# Patient Record
Sex: FEMALE | NOT HISPANIC OR LATINO | URBAN - METROPOLITAN AREA
[De-identification: names, ages, dates, MRNs, and addresses within clinical notes are randomized per-mention and may not be internally consistent; named-entity substitution may affect disease eponyms.]

---

## 2018-10-24 ENCOUNTER — IMPORTED ENCOUNTER (OUTPATIENT)
Dept: URBAN - METROPOLITAN AREA CLINIC 1 | Facility: CLINIC | Age: 58
End: 2018-10-24

## 2018-10-24 PROBLEM — H40.023: Noted: 2018-10-24

## 2018-10-24 PROBLEM — H25.813: Noted: 2018-10-24

## 2018-10-24 PROBLEM — H04.123: Noted: 2018-10-24

## 2018-10-24 PROBLEM — H16.143: Noted: 2018-10-24

## 2018-10-24 PROCEDURE — 92014 COMPRE OPH EXAM EST PT 1/>: CPT

## 2018-10-24 PROCEDURE — 92133 CPTRZD OPH DX IMG PST SGM ON: CPT

## 2018-10-24 PROCEDURE — 92015 DETERMINE REFRACTIVE STATE: CPT

## 2018-10-24 NOTE — PATIENT DISCUSSION
1.  Glaucoma Suspect OU (CD: 0.55 / 0.60) -- IOP stable today at 14 OU. Negative family h/o Glaucoma. OCT today shows minimal thinning OU. Patient is considered high risk. Condition was discussed with patient and patient understands. Will continue to monitor patient for any progression in condition. Patient was advised to call us with any problems questions or concerns. 2.  NOELLE w/ PEK OU -- Recommend the frequent use of OTC AT's BID-QID OU. 3.  Cataracts OU -- Observe for now without intervention. The patient was advised to contact us if any change or worsening of vision. 4. Pituitary Adenoma -- Previous : WNL OU. Finalized Glasses MRx & given to patient today. Return for an appointment in 6 MO for a 10 / 24-2 HVF OU with Dr. Daly Moctezuma.

## 2019-11-18 ENCOUNTER — IMPORTED ENCOUNTER (OUTPATIENT)
Dept: URBAN - METROPOLITAN AREA CLINIC 1 | Facility: CLINIC | Age: 59
End: 2019-11-18

## 2019-11-18 PROBLEM — H25.813: Noted: 2019-11-18

## 2019-11-18 PROBLEM — H40.023: Noted: 2019-11-18

## 2019-11-18 PROBLEM — H40.013: Noted: 2019-11-18

## 2019-11-18 PROBLEM — H16.143: Noted: 2019-11-18

## 2019-11-18 PROBLEM — H04.123: Noted: 2019-11-18

## 2019-11-18 PROCEDURE — 92083 EXTENDED VISUAL FIELD XM: CPT

## 2019-11-18 PROCEDURE — 92014 COMPRE OPH EXAM EST PT 1/>: CPT

## 2019-11-18 PROCEDURE — 92015 DETERMINE REFRACTIVE STATE: CPT

## 2019-11-18 NOTE — PATIENT DISCUSSION
1.  Glaucoma Suspect OU (CD 0.55/0.60): HVF shows non specific loss OU. IOP stable. Negative family hx. Patient is considered Low Risk. Condition was discussed with patient and patient understands. Will continue to monitor patient for any progression in condition. Patient was advised to call us with any problems questions or concerns. 2.  Cataract OU: Observe for now without intervention. The patient was advised to contact us if any change or worsening of vision3. NOELLE w/ PEK OU- Recommend ATs TID OU routinely 4. Pituitary Adenoma - Previous : WNL OU. Patient to have follow up with Neurology 1-2 years stable per patient. MRX for glasses given. Return for an appointment in 1 year 30/OCT with Dr. Jerson Dejesus.

## 2020-12-21 ENCOUNTER — IMPORTED ENCOUNTER (OUTPATIENT)
Dept: URBAN - METROPOLITAN AREA CLINIC 1 | Facility: CLINIC | Age: 60
End: 2020-12-21

## 2020-12-21 PROBLEM — H25.813: Noted: 2020-12-21

## 2020-12-21 PROBLEM — H16.143: Noted: 2020-12-21

## 2020-12-21 PROBLEM — H04.123: Noted: 2020-12-21

## 2020-12-21 PROBLEM — H40.013: Noted: 2020-12-21

## 2020-12-21 PROCEDURE — 92015 DETERMINE REFRACTIVE STATE: CPT

## 2020-12-21 PROCEDURE — 92014 COMPRE OPH EXAM EST PT 1/>: CPT

## 2020-12-21 NOTE — PATIENT DISCUSSION
1.  Glaucoma Suspect OU -- (C/D: 0.55/0.60) IOP 15 OU. T-Max 15 OU. (-) Family Hx. Will return for OCT at next visit. Patient is considered Low Risk. Condition was discussed with patient and patient understands. Will continue to monitor patient for any progression in condition. Patient was advised to call us with any problems questions or concerns. 2.  Cataract OU -- Glare complaint noted from patient today. Observe for now without intervention. The patient was advised to contact us if any change or worsening of vision3. NOELLE w/ PEK OU -- Cont the use of ATs TID OU routinely (Sample of Blink & Lumify Given). 4.  Pituitary Adenoma -- Previous  WNL OU. MRx for glasses given to patient. Return for an appointment in 1 year 30/OCT with Dr. Precious Reese.

## 2022-04-02 ASSESSMENT — VISUAL ACUITY
OS_CC: J1
OD_CC: 20/25
OS_CC: J1+
OS_CC: 20/70
OD_GLARE: 20/40
OD_CC: J1+
OS_GLARE: 20/40
OS_CC: 20/40
OD_CC: 20/30
OD_GLARE: 20/40
OD_CC: 20/60
OS_CC: 20/50-2
OD_CC: J2
OS_GLARE: 20/40
OS_CC: J2
OD_CC: J1

## 2022-04-02 ASSESSMENT — KERATOMETRY
OS_AXISANGLE_DEGREES: 150
OS_K1POWER_DIOPTERS: 40.25
OD_K1POWER_DIOPTERS: 41.00
OD_AXISANGLE_DEGREES: 159
OS_K2POWER_DIOPTERS: 41.25
OS_AXISANGLE2_DEGREES: 060
OD_AXISANGLE2_DEGREES: 069
OD_K2POWER_DIOPTERS: 40.25

## 2022-04-02 ASSESSMENT — TONOMETRY
OS_IOP_MMHG: 15
OD_IOP_MMHG: 14
OS_IOP_MMHG: 14
OD_IOP_MMHG: 15
OS_IOP_MMHG: 15
OD_IOP_MMHG: 15